# Patient Record
Sex: FEMALE | Race: WHITE | NOT HISPANIC OR LATINO
[De-identification: names, ages, dates, MRNs, and addresses within clinical notes are randomized per-mention and may not be internally consistent; named-entity substitution may affect disease eponyms.]

---

## 2020-12-09 ENCOUNTER — APPOINTMENT (OUTPATIENT)
Dept: GASTROENTEROLOGY | Facility: CLINIC | Age: 65
End: 2020-12-09
Payer: COMMERCIAL

## 2020-12-09 VITALS
HEART RATE: 79 BPM | WEIGHT: 160 LBS | OXYGEN SATURATION: 98 % | DIASTOLIC BLOOD PRESSURE: 80 MMHG | SYSTOLIC BLOOD PRESSURE: 140 MMHG | HEIGHT: 62 IN | BODY MASS INDEX: 29.44 KG/M2 | TEMPERATURE: 98.4 F

## 2020-12-09 DIAGNOSIS — Z86.39 PERSONAL HISTORY OF OTHER ENDOCRINE, NUTRITIONAL AND METABOLIC DISEASE: ICD-10-CM

## 2020-12-09 DIAGNOSIS — Z78.9 OTHER SPECIFIED HEALTH STATUS: ICD-10-CM

## 2020-12-09 DIAGNOSIS — Z00.00 ENCOUNTER FOR GENERAL ADULT MEDICAL EXAMINATION W/OUT ABNORMAL FINDINGS: ICD-10-CM

## 2020-12-09 DIAGNOSIS — Z12.11 ENCOUNTER FOR SCREENING FOR MALIGNANT NEOPLASM OF COLON: ICD-10-CM

## 2020-12-09 PROCEDURE — 99203 OFFICE O/P NEW LOW 30 MIN: CPT

## 2020-12-09 PROCEDURE — 99072 ADDL SUPL MATRL&STAF TM PHE: CPT

## 2020-12-09 RX ORDER — PRAVASTATIN SODIUM 40 MG/1
40 TABLET ORAL
Refills: 0 | Status: ACTIVE | COMMUNITY

## 2020-12-09 RX ORDER — SODIUM SULFATE, POTASSIUM SULFATE, MAGNESIUM SULFATE 17.5; 3.13; 1.6 G/ML; G/ML; G/ML
17.5-3.13-1.6 SOLUTION, CONCENTRATE ORAL
Qty: 1 | Refills: 0 | Status: ACTIVE | COMMUNITY
Start: 2020-12-09 | End: 1900-01-01

## 2020-12-09 NOTE — HISTORY OF PRESENT ILLNESS
[FreeTextEntry1] : patient comes after an extended absence to arrange for screening colonoscopy. She looks and feels well. She is accompanied by her daughter who acted as an .

## 2020-12-09 NOTE — ASSESSMENT
[FreeTextEntry1] : Impression and plan\par \par Patient came to the office today to arrange for colonoscopy. The risks benefits alternatives and limitations of the procedure were discussed. I will make further suggestions pending above

## 2020-12-23 PROBLEM — Z12.11 ENCOUNTER FOR SCREENING COLONOSCOPY: Status: RESOLVED | Noted: 2020-12-09 | Resolved: 2020-12-23

## 2020-12-28 LAB — SARS-COV-2 N GENE NPH QL NAA+PROBE: NOT DETECTED

## 2020-12-30 ENCOUNTER — APPOINTMENT (OUTPATIENT)
Dept: GASTROENTEROLOGY | Facility: AMBULATORY MEDICAL SERVICES | Age: 65
End: 2020-12-30
Payer: COMMERCIAL

## 2020-12-30 PROCEDURE — 45380 COLONOSCOPY AND BIOPSY: CPT

## 2022-08-09 ENCOUNTER — APPOINTMENT (OUTPATIENT)
Dept: MAMMOGRAPHY | Facility: CLINIC | Age: 67
End: 2022-08-09

## 2022-08-09 ENCOUNTER — APPOINTMENT (OUTPATIENT)
Dept: RADIOLOGY | Facility: CLINIC | Age: 67
End: 2022-08-09

## 2022-08-09 PROCEDURE — 77067 SCR MAMMO BI INCL CAD: CPT

## 2022-08-09 PROCEDURE — 77080 DXA BONE DENSITY AXIAL: CPT

## 2022-08-09 PROCEDURE — 77063 BREAST TOMOSYNTHESIS BI: CPT

## 2022-08-25 ENCOUNTER — APPOINTMENT (OUTPATIENT)
Dept: ULTRASOUND IMAGING | Facility: IMAGING CENTER | Age: 67
End: 2022-08-25

## 2022-08-25 ENCOUNTER — APPOINTMENT (OUTPATIENT)
Dept: MAMMOGRAPHY | Facility: IMAGING CENTER | Age: 67
End: 2022-08-25

## 2022-08-25 ENCOUNTER — OUTPATIENT (OUTPATIENT)
Dept: OUTPATIENT SERVICES | Facility: HOSPITAL | Age: 67
LOS: 1 days | End: 2022-08-25
Payer: COMMERCIAL

## 2022-08-25 DIAGNOSIS — Z00.8 ENCOUNTER FOR OTHER GENERAL EXAMINATION: ICD-10-CM

## 2022-08-25 PROCEDURE — 76642 ULTRASOUND BREAST LIMITED: CPT | Mod: 26,RT

## 2022-08-25 PROCEDURE — G0279: CPT

## 2022-08-25 PROCEDURE — 77065 DX MAMMO INCL CAD UNI: CPT | Mod: 26,RT

## 2022-08-25 PROCEDURE — G0279: CPT | Mod: 26

## 2022-08-25 PROCEDURE — 76642 ULTRASOUND BREAST LIMITED: CPT

## 2022-08-25 PROCEDURE — 77065 DX MAMMO INCL CAD UNI: CPT

## 2025-08-20 ENCOUNTER — NON-APPOINTMENT (OUTPATIENT)
Age: 70
End: 2025-08-20

## 2025-08-21 ENCOUNTER — APPOINTMENT (OUTPATIENT)
Dept: GASTROENTEROLOGY | Facility: CLINIC | Age: 70
End: 2025-08-21
Payer: MEDICARE

## 2025-08-21 VITALS
HEIGHT: 62 IN | WEIGHT: 157 LBS | DIASTOLIC BLOOD PRESSURE: 80 MMHG | OXYGEN SATURATION: 98 % | TEMPERATURE: 97 F | HEART RATE: 65 BPM | BODY MASS INDEX: 28.89 KG/M2 | SYSTOLIC BLOOD PRESSURE: 130 MMHG

## 2025-08-21 DIAGNOSIS — Z00.00 ENCOUNTER FOR GENERAL ADULT MEDICAL EXAMINATION W/OUT ABNORMAL FINDINGS: ICD-10-CM

## 2025-08-21 PROCEDURE — 99203 OFFICE O/P NEW LOW 30 MIN: CPT

## 2025-08-21 RX ORDER — ATORVASTATIN CALCIUM 20 MG/1
20 TABLET, FILM COATED ORAL
Refills: 0 | Status: ACTIVE | COMMUNITY

## 2025-08-25 RX ORDER — SODIUM SULFATE, POTASSIUM SULFATE AND MAGNESIUM SULFATE 1.6; 3.13; 17.5 G/177ML; G/177ML; G/177ML
17.5-3.13-1.6 SOLUTION ORAL
Qty: 1 | Refills: 0 | Status: ACTIVE | COMMUNITY
Start: 2025-08-25 | End: 1900-01-01

## 2025-08-28 ENCOUNTER — APPOINTMENT (OUTPATIENT)
Dept: GASTROENTEROLOGY | Facility: AMBULATORY MEDICAL SERVICES | Age: 70
End: 2025-08-28
Payer: MEDICARE

## 2025-08-28 PROCEDURE — 45378 DIAGNOSTIC COLONOSCOPY: CPT | Mod: PT
